# Patient Record
Sex: MALE | Race: WHITE | URBAN - METROPOLITAN AREA
[De-identification: names, ages, dates, MRNs, and addresses within clinical notes are randomized per-mention and may not be internally consistent; named-entity substitution may affect disease eponyms.]

---

## 2017-06-04 ENCOUNTER — TELEPHONE (OUTPATIENT)
Dept: GERIATRICS | Facility: CLINIC | Age: 77
End: 2017-06-04

## 2017-06-04 NOTE — TELEPHONE ENCOUNTER
"Patient pulled out payne   Large amount of bleeding suggesting trauma.  Patient very agitated and nursing states he keeps saying \"go away. Leave me alone\"  No c/o penile pain, no obvious trauma but large amounts of blood   On asa   Nursing reports patient pulling on payne ever since he arrived.  Patient confused and unable to be redirected..  Patient not aware he pulled out payne or what the payne is for.  Order  Keep payne out for now  Bladder scan bid  St cath bid for bladder scan   "

## 2017-06-06 ENCOUNTER — NURSING HOME VISIT (OUTPATIENT)
Dept: GERIATRICS | Facility: CLINIC | Age: 77
End: 2017-06-06
Payer: COMMERCIAL

## 2017-06-06 VITALS
DIASTOLIC BLOOD PRESSURE: 78 MMHG | WEIGHT: 185.8 LBS | RESPIRATION RATE: 20 BRPM | HEART RATE: 71 BPM | SYSTOLIC BLOOD PRESSURE: 177 MMHG | TEMPERATURE: 97.2 F | OXYGEN SATURATION: 94 %

## 2017-06-06 DIAGNOSIS — F01.50 VASCULAR DEMENTIA WITHOUT BEHAVIORAL DISTURBANCE (H): Primary | ICD-10-CM

## 2017-06-06 DIAGNOSIS — N39.0 ACUTE URINARY TRACT INFECTION: ICD-10-CM

## 2017-06-06 DIAGNOSIS — E11.22 TYPE 2 DIABETES MELLITUS WITH STAGE 3 CHRONIC KIDNEY DISEASE, WITH LONG-TERM CURRENT USE OF INSULIN (H): ICD-10-CM

## 2017-06-06 DIAGNOSIS — Z71.89 ADVANCED DIRECTIVES, COUNSELING/DISCUSSION: ICD-10-CM

## 2017-06-06 DIAGNOSIS — N18.30 TYPE 2 DIABETES MELLITUS WITH STAGE 3 CHRONIC KIDNEY DISEASE, WITH LONG-TERM CURRENT USE OF INSULIN (H): ICD-10-CM

## 2017-06-06 DIAGNOSIS — Z79.4 TYPE 2 DIABETES MELLITUS WITH STAGE 3 CHRONIC KIDNEY DISEASE, WITH LONG-TERM CURRENT USE OF INSULIN (H): ICD-10-CM

## 2017-06-06 DIAGNOSIS — N18.30 CHRONIC KIDNEY DISEASE, STAGE III (MODERATE) (H): ICD-10-CM

## 2017-06-06 DIAGNOSIS — I10 ESSENTIAL HYPERTENSION: ICD-10-CM

## 2017-06-06 DIAGNOSIS — E11.42 TYPE 2 DIABETES MELLITUS WITH DIABETIC POLYNEUROPATHY, WITHOUT LONG-TERM CURRENT USE OF INSULIN (H): ICD-10-CM

## 2017-06-06 DIAGNOSIS — R33.9 URINARY RETENTION: ICD-10-CM

## 2017-06-06 PROBLEM — N17.9 ACUTE RENAL FAILURE (H): Status: ACTIVE | Noted: 2017-05-23

## 2017-06-06 PROBLEM — K74.60 HEPATIC CIRRHOSIS (H): Status: ACTIVE | Noted: 2017-05-23

## 2017-06-06 PROBLEM — E11.21 DIABETIC NEPHROPATHY (H): Status: ACTIVE | Noted: 2017-05-23

## 2017-06-06 PROCEDURE — 99310 SBSQ NF CARE HIGH MDM 45: CPT | Performed by: NURSE PRACTITIONER

## 2017-06-06 PROCEDURE — 99207 ZZC CDG-CORRECTLY CODED, REVIEWED AND AGREE: CPT | Performed by: NURSE PRACTITIONER

## 2017-06-06 RX ORDER — TAMSULOSIN HYDROCHLORIDE 0.4 MG/1
0.4 CAPSULE ORAL DAILY
COMMUNITY
Start: 2017-06-02

## 2017-06-06 RX ORDER — OLANZAPINE 5 MG/1
5 TABLET, ORALLY DISINTEGRATING ORAL AT BEDTIME
COMMUNITY
Start: 2017-06-01 | End: 2017-06-30

## 2017-06-06 NOTE — PROGRESS NOTES
[place] [facility]  PRIMARY CARE PROVIDER AND CLINIC:  No primary care provider on file. No primary physician on file.  Chief Complaint   Patient presents with     Clinic Care Coordination - Initial     Hospital F/U       HPI:    Jasson Argueta is a 77 year old  (1940),admitted to the [facility] [place] from [facility].  Hospital stay 5/22/17  through 6/1/17.  Admitted to this facility for  rehab, medical management and nursing care.  Current issues are:         Vascular dementia without behavioral disturbance  Acute urinary tract infection  Type 2 diabetes mellitus with stage 3 chronic kidney disease, with long-term current use of insulin (H)  Type 2 diabetes mellitus with diabetic polyneuropathy, without long-term current use of insulin (H)  Chronic kidney disease, stage III (moderate)  Essential hypertension  Advanced directives, counseling/discussion   Patient admitted to hospital after being found down-he had not gotten the paper in several days and neighbors called 911. 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 He was found to be confused, with UTI, dehydration, arf, DM2, HTN. He was rehydrated, developed some urinary retention and a catheter was placed. Started on flomax. He was then moved to TCU for ongoing therapy, strengthening.     Today, he is alert, confused and forgetful. He is mostly compliant with cares, but forgets that he should not walk alone. He is supposed to walk with staff/ rolling walker. He states the year as 2012, the month as January, his location as [place], he cannot recall the name of his wife nor his daughter, reports he has a son-Martin.     CODE STATUS/ADVANCE DIRECTIVES DISCUSSION:   CPR/Full code   Patient's living condition: lives with spouse    ALLERGIES:Review of patient's allergies indicates no known allergies.  PAST MEDICAL HISTORY:  has no past medical history on file.  PAST SURGICAL HISTORY:  has no past surgical  history on file.  FAMILY HISTORY: family history is not on file.  SOCIAL HISTORY:      Post Discharge Medication Reconciliation Status: discharge medications reconciled and changed, per note/orders (see AVS).  Current Outpatient Prescriptions   Medication Sig Dispense Refill     ASPIRIN PO Take 81 mg by mouth daily       tamsulosin (FLOMAX) 0.4 MG capsule Take 0.4 mg by mouth daily       AmLODIPine Besylate (NORVASC PO) Take 10 mg by mouth daily       OLANZapine zydis (ZYPREXA) 5 MG ODT tab Take 5 mg by mouth At Bedtime       PRAVASTATIN SODIUM PO Take 40 mg by mouth At Bedtime       METOPROLOL SUCCINATE ER PO Take 50 mg by mouth 2 times daily         ROS:  Unreliable secondary to cognitive impairment or aphasia, but today pt reports no concerns.     Exam:  /78  Pulse 71  Temp 97.2  F (36.2  C)  Resp 20  Wt 185 lb 12.8 oz (84.3 kg)  SpO2 94%  GENERAL APPEARANCE:  Alert, in no distress  HEAD:  Normal, normocephalic, atraumatic  EYE EXAM: normal external eye, conjunctiva, lids, ALEJANDRA  NECK EXAM: supple, no JVD  CHEST/RESP:  respiratory effort normal, lung sounds CTA , no respiratory distress  CV:  Rate reg, rhythm reg, no murmur, no peripheral edema   M/S:   extremities normal, gait abnormal-falls risk, normal muscle tone, and range of motion normal   NEUROLOGIC EXAM: Normal gross motor movement, tone and coordination. No tremor.  Cranial nerves 2-12 are normal tested and grossly at patient's baseline  PSYCH:  Alert and oriented to self, affect pleasant , judgement impaired by cognitive losses     Lab/Diagnostic data:     POTASSIUM (05/30/2017 5:28 PM)  Only the most recent of 4 results within the time period is included.    POTASSIUM (05/30/2017 5:28 PM)   Component Value Ref Range   POTASSIUM 4.3 3.5 - 5.0 mmol/L     POTASSIUM (05/30/2017 5:28 PM)   Specimen Performing Laboratory   Blood [facility] LABORATORY    INTERNAL ZIP [id number]    [address] [place] [address]     Back to top  of Results      MAGNESIUM (05/30/2017 5:28 PM)  Only the most recent of 3 results within the time period is included.    MAGNESIUM (05/30/2017 5:28 PM)   Component Value Ref Range   MAGNESIUM 1.5 (L) 1.6 - 2.6 mg/dL     MAGNESIUM (05/30/2017 5:28 PM)   Specimen Performing Laboratory   Blood [facility] LABORATORY    INTERNAL ZIP [id number]    [address] [place] [address]     Back to top of Results      CREATININE (05/30/2017 6:14 AM)  Only the most recent of 3 results within the time period is included.    CREATININE (05/30/2017 6:14 AM)   Component Value Ref Range   CREATININE 1.19 0.72 - 1.25 mg/dL   GFR if African American >60 >60 ml/min/1.73m2   GFR if not African American 59 (L) >60 ml/min/1.73m2     CREATININE (05/30/2017 6:14 AM)   Specimen Performing Laboratory   Blood [facility] LABORATORY    INTERNAL ZIP [id number]    [address] [place] [address]     Back to top of Results      URINALYSIS MICROSCOPIC (05/29/2017 6:35 PM)  Only the most recent of 2 results within the time period is included.    URINALYSIS MICROSCOPIC (05/29/2017 6:35 PM)   Component Value Ref Range   RBC 3-5 (A) 0-2, None Seen /HPF   WBC 6-10 (A) 0-2, 3-5, None Seen /HPF   BACTERIA  Few None Seen, Few Bacteria/HPF   EPITHELIAL CELLS  None Seen None Seen, Few Epi/HPF     URINALYSIS MICROSCOPIC (05/29/2017 6:35 PM)   Specimen Performing Laboratory   Urine [facility] LABORATORY    INTERNAL ZIP [id number]    [address] [place] [address]     Back to top of Results      URINE CULTURE (05/29/2017 6:35 PM)  Only the most recent of 2 results within the time period is included.    URINE CULTURE (05/29/2017 6:35 PM)   Component Value Ref Range   CULTURE No growth (<1,000 CFU/mL)       URINE CULTURE (05/29/2017 6:35 PM)   Specimen Performing Laboratory   Urine [facility] LABORATORY-CENTRAL LABORATORY    [address]    [place] [address]     Back to top of  Results      URINALYSIS W REFLEX MICROSCOPIC IF POSITIVE (05/29/2017 6:35 PM)  Only the most recent of 2 results within the time period is included.    URINALYSIS W REFLEX MICROSCOPIC IF POSITIVE (05/29/2017 6:35 PM)   Component Value Ref Range   COLOR  Yellow Yellow Color   CLARITY  Clear Clear Clarity   SPECIFIC GRAVITY,URINE  1.010 1.010, 1.015, 1.020, 1.025    PH,URINE  6.5 6.0, 7.0, 8.0, 5.5, 6.5, 7.5, 8.5    UROBILINOGEN,QUALITATIVE  Normal Normal EU/dl   PROTEIN, URINE 100 (A) Negative mg/dL   GLUCOSE, URINE 100 (A) Negative mg/dL   KETONES,URINE  Negative Negative mg/dL   BILIRUBIN,URINE  Negative Negative    OCCULT BLOOD,URINE  Trace (A) Negative    NITRITE  Negative Negative    LEUKOCYTE ESTERASE  Negative Negative      URINALYSIS W REFLEX MICROSCOPIC IF POSITIVE (05/29/2017 6:35 PM)   Specimen Performing Laboratory   Urine [facility] LABORATORY    INTERNAL ZIP [id number] [address] [place] [address]     Back to top of Results      BASIC METABOLIC PANEL (05/28/2017 6:16 AM)  Only the most recent of 3 results within the time period is included.    BASIC METABOLIC PANEL (05/28/2017 6:16 AM)   Component Value Ref Range   SODIUM 142 135 - 145 mmol/L   POTASSIUM 3.3 (L) 3.5 - 5.0 mmol/L   CHLORIDE 112 (H) 98 - 110 mmol/L   CO2,TOTAL 20 (L) 21 - 31 mmol/L   ANION GAP 10 5 - 18    GLUCOSE 126 (H) 65 - 100 mg/dL   CALCIUM 8.3 (L) 8.5 - 10.5 mg/dL   BUN 24 8 - 25 mg/dL   CREATININE 1.45 (H) 0.72 - 1.25 mg/dL   BUN/CREAT RATIO  17 10 - 20    GFR if  57 (L) >60 ml/min/1.73m2   GFR if not  47 (L) >60 ml/min/1.73m2     BASIC METABOLIC PANEL (05/28/2017 6:16 AM)   Specimen Performing Laboratory   Blood [facility] LABORATORY    INTERNAL ZIP [id number] [address] [place] [address]     Back to top of Results      CBC WITH AUTO DIFFERENTIAL (05/27/2017 6:46 AM)  Only the most recent of 2 results within the time period is included.    CBC  WITH AUTO DIFFERENTIAL (05/27/2017 6:46 AM)   Component Value Ref Range   WHITE BLOOD COUNT  6.3 4.5 - 11.0 thou/cu mm   RED BLOOD COUNT  4.39 4.30 - 5.90 mil/cu mm   HEMOGLOBIN  12.4 (L) 13.5 - 17.5 g/dL   HEMATOCRIT  37.1 37.0 - 53.0 %   MCV  85 80 - 100 fL   MCH  28.2 26.0 - 34.0 pg   MCHC  33.4 32.0 - 36.0 g/dL   RDW  16.6 (H) 11.5 - 15.5 %   PLATELET COUNT  196 140 - 440 thou/cu mm   MPV  10.8 6.5 - 11.0 fL   NEUTROPHILS  58.8 42.0 - 72.0 %   LYMPHOCYTES  26.4 20.0 - 44.0 %   MONOCYTES  9.9 <12.0 %   EOSINOPHILS  4.3 <8.0 %   BASOPHILS  0.3 <3.0 %   IMMATURE GRANULOCYTES(METAS,MYELOS,PROS) 0.3 %   ABSOLUTE NEUTROPHILS  3.7 1.7 - 7.0 thou/cu mm   ABSOLUTE LYMPHOCYTES  1.7 0.9 - 2.9 thou/cu mm   ABSOLUTE MONOCYTES  0.6 <0.9 thou/cu mm   ABSOLUTE EOSINOPHILS  0.3 <0.5 thou/cu mm   ABSOLUTE BASOPHILS  0.0 <0.3 thou/cu mm   ABSOLUTE IMMATURE GRANULOCYTES(METAS,MYELOS,PROS) 0.0 <0.3 thou/cu mm     TSH (05/25/2017 7:14 AM)  TSH (05/25/2017 7:14 AM)   Component Value Ref Range   TSH 0.09 (L) 0.35 - 4.94 uIU/mL       T3,TOTAL (05/25/2017 7:14 AM)  T3,TOTAL (05/25/2017 7:14 AM)   Component Value Ref Range   T3,TOTAL 80 58 - 159 ng/dL     T4,FREE (05/25/2017 7:14 AM)  T4,FREE (05/25/2017 7:14 AM)   Component Value Ref Range   T4,FREE 1.24 0.70 - 1.80 ng/dL     FOLIC ACID (05/25/2017 7:14 AM)  FOLIC ACID (05/25/2017 7:14 AM)   Component Value Ref Range   FOLIC ACID 17.6 >=4.0 ng/mL       VITAMIN B12 (05/25/2017 7:14 AM)  VITAMIN B12 (05/25/2017 7:14 AM)   Component Value Ref Range   VITAMIN B12 889 180 - 914 pg/mL       AMMONIA (05/24/2017 6:56 AM)  AMMONIA (05/24/2017 6:56 AM)   Component Value Ref Range   AMMONIA 23 11 - 35 umol/L       RENAL FUNCTION PANEL (05/23/2017 8:48 AM)  Only the most recent of 3 results within the time period is included.    RENAL FUNCTION PANEL (05/23/2017 8:48 AM)   Component Value Ref Range   SODIUM 143 135 - 145 mmol/L   POTASSIUM 3.7 3.5 - 5.0 mmol/L   CHLORIDE 111 (H) 98 - 110 mmol/L    CO2,TOTAL 20 (L) 21 - 31 mmol/L   ANION GAP 12 5 - 18    GLUCOSE 128 (H) 65 - 100 mg/dL   CALCIUM 8.2 (L) 8.5 - 10.5 mg/dL   BUN 51 (H) 8 - 25 mg/dL   CREATININE 1.92 (H) 0.72 - 1.25 mg/dL   BUN/CREAT RATIO  27 (H) 10 - 20    GFR if  41 (L) >60 ml/min/1.73m2   GFR if not  34 (L) >60 ml/min/1.73m2   PHOSPHORUS 2.7 2.3 - 4.7 mg/dL   ALBUMIN 2.8 (L) 3.2 - 4.6 g/dL       Hemoglobin A1C (05/22/2017 11:56 PM)  Hemoglobin A1C (05/22/2017 11:56 PM)   Component Value Ref Range   HEMOGLOBIN A1C MONITORING (POCT) 5.1 <=6.4 %           ASSESSMENT/PLAN:  Vascular dementia without behavioral disturbance  Fairly sudden onset, per daughter report, over the last few months. She reports he has been doing a good job of taking care of his spouse, managing the home.     Acute urinary tract infection  New, treatment with antibiotics while hospitalized, no antibiotics at discharge from hospital     Type 2 diabetes mellitus with stage 3 chronic kidney disease, with long-term current use of insulin (H)  Type 2 diabetes mellitus with diabetic polyneuropathy, without long-term current use of insulin (H)  Chronic long standing high dose insulin use, (70/30 70+ units daily ) and this was discontinued while hospitalized. A1C low, at 5 %. No current blood sugar being tracked    Chronic kidney disease, stage III (moderate)  Reported creat as above. Avoid nephrotoxic medications, Renal dosing of medications, monitor kidney function this week.      Essential hypertension  On amlodipine, metoprolol for rate control. The current medical regimen is effective;  continue present plan and medications.     Advanced directives, counseling/discussion  Signed POLST sent to [facility] for scanning  - Full Code    Urinary retention  Reported urinary retention and started on flomax. Upon his arrival to TCU, he traumatically removed the payne with balloon intact. He has been voiding, PVR <400 and no need to strait cath yet. Will  monitor and consider urology referral if he so desires.        Orders:  1.  Blood sugar checks BID.  Dx:  DM2  2.  DC Intake and output  3.  Lab draws 6/8/17 to include:  BMP dx:  CKD, CBC dx:  Anemia, A1C dx:  DM2  4.  Continue post void bladder scans and BID strait cath if scan >400cc.  Dx:  Urinary retention      Information reviewed:  Medications, vital signs, orders, nursing notes, problem list, hospital information. Total time spent with patient visit was 35 min including patient visit, review of past records and phone call to patient contact. Greater than 50% of total time spent with counseling and coordinating care.    Electronically signed by:  Eugenie Barkley CNP   [facility]  [phone] cell

## 2017-06-08 ENCOUNTER — HOSPITAL LABORATORY (OUTPATIENT)
Facility: OTHER | Age: 77
End: 2017-06-08

## 2017-06-08 LAB
ANION GAP SERPL CALCULATED.3IONS-SCNC: 9 MMOL/L (ref 3–14)
BUN SERPL-MCNC: 20 MG/DL (ref 7–30)
CALCIUM SERPL-MCNC: 8.4 MG/DL (ref 8.5–10.1)
CHLORIDE SERPL-SCNC: 112 MMOL/L (ref 94–109)
CO2 SERPL-SCNC: 23 MMOL/L (ref 20–32)
CREAT SERPL-MCNC: 1.18 MG/DL (ref 0.66–1.25)
ERYTHROCYTE [DISTWIDTH] IN BLOOD BY AUTOMATED COUNT: 17.1 % (ref 10–15)
GFR SERPL CREATININE-BSD FRML MDRD: 60 ML/MIN/1.7M2
GLUCOSE SERPL-MCNC: 155 MG/DL (ref 70–99)
HBA1C MFR BLD: 5.9 % (ref 4.3–6)
HCT VFR BLD AUTO: 32 % (ref 40–53)
HGB BLD-MCNC: 10.3 G/DL (ref 13.3–17.7)
MCH RBC QN AUTO: 27.8 PG (ref 26.5–33)
MCHC RBC AUTO-ENTMCNC: 32.2 G/DL (ref 31.5–36.5)
MCV RBC AUTO: 86 FL (ref 78–100)
PLATELET # BLD AUTO: 195 10E9/L (ref 150–450)
POTASSIUM SERPL-SCNC: 3.8 MMOL/L (ref 3.4–5.3)
RBC # BLD AUTO: 3.71 10E12/L (ref 4.4–5.9)
SODIUM SERPL-SCNC: 144 MMOL/L (ref 133–144)
WBC # BLD AUTO: 6.7 10E9/L (ref 4–11)

## 2017-06-13 VITALS
HEART RATE: 60 BPM | DIASTOLIC BLOOD PRESSURE: 60 MMHG | SYSTOLIC BLOOD PRESSURE: 138 MMHG | OXYGEN SATURATION: 95 % | RESPIRATION RATE: 18 BRPM | WEIGHT: 185.8 LBS | TEMPERATURE: 97.5 F

## 2017-06-13 NOTE — PROGRESS NOTES
[place] [facility]  PRIMARY CARE PROVIDER AND CLINIC:  Nickolas Barahona [facility] [address] / [facility] [address]  Chief Complaint   Patient presents with     Clinic Care Coordination - Initial       HPI:    Jasson Argueta is a 77 year old  (1940),admitted to the [facility] [place] from [facility].  Hospital stay 5/22/17  through 6/1/17.  Admitted to this facility for  rehab, medical management and nursing care.  Current issues are:      Non-traumatic rhabdomyolysis  - admitted to hospital for rhabdomyolysis, complicated with renal failure, and also had UTI. All these resolved. Admitted to this facility for rehab.     Physical deconditioning  -started on OT/PT and repots making some kelly.     Type 2 diabetes mellitus with stage 3 chronic kidney disease, with long-term current use of insulin (H)  - had  Hypoglycemia during hospitalization, insulin stopped. Oral intake is good.     Essential hypertension  - No other significant past medical history or family history.denies HA    Common wart  over left hand and wants it removed.     Vascular dementia without behavioral disturbance  - no issue reported.     CODE STATUS/ADVANCE DIRECTIVES DISCUSSION:   CPR/Full code   Patient's living condition: lives with spouse    ALLERGIES:Review of patient's allergies indicates no known allergies.  PAST MEDICAL HISTORY:  T2DM, CKD, dementia, HTN  PAST SURGICAL HISTORY:  has no past surgical history on file.  FAMILY HISTORY: non contributory.   SOCIAL HISTORY:  reports that he has been smoking Cigarettes.  He has a 25.00 pack-year smoking history. He does not have any smokeless tobacco history on file. He reports that he does not use illicit drugs.    Post Discharge Medication Reconciliation Status: discharge medications reconciled and changed, per note/orders (see AVS).  Current Outpatient Prescriptions   Medication Sig Dispense Refill     ASPIRIN PO Take 81 mg by mouth daily        tamsulosin (FLOMAX) 0.4 MG capsule Take 0.4 mg by mouth daily       AmLODIPine Besylate (NORVASC PO) Take 10 mg by mouth daily       OLANZapine zydis (ZYPREXA) 5 MG ODT tab Take 5 mg by mouth At Bedtime       PRAVASTATIN SODIUM PO Take 40 mg by mouth At Bedtime       METOPROLOL SUCCINATE ER PO Take 50 mg by mouth 2 times daily       insulin glargine (LANTUS) 100 UNIT/ML injection Inject 10 Units Subcutaneous every morning         ROS:  10 point ROS of systems including Constitutional, Eyes, Respiratory, Cardiovascular, Gastroenterology, Genitourinary, Integumentary, Muscularskeletal, Psychiatric were all negative except for pertinent positives noted in my HPI.    Exam:  /60  Pulse 60  Temp 97.5  F (36.4  C)  Resp 18  Wt 185 lb 12.8 oz (84.3 kg)  SpO2 95%  GENERAL APPEARANCE:  Alert, in no distress  ENT:  Mouth and posterior oropharynx normal, moist mucous membranes  EYES:  EOM, conjunctivae, lids, pupils and irises normal  NECK:  No adenopathy,masses or thyromegaly  RESP:  respiratory effort and palpation of chest normal, lungs clear to auscultation , no respiratory distress  CV:  Palpation and auscultation of heart done , regular rate and rhythm, no murmur, rub, or gallop, peripheral edema 1+ pedal b/l. + in .  ABDOMEN:  normal bowel sounds, soft, nontender, no hepatosplenomegaly or other masses  M/S:   Gait and station abnormal . Limbs on the right side. Proximal muscles weakness. Kyphosis.   SKIN:  Inspection of skin and subcutaneous tissue baseline, Palpation of skin and subcutaneous tissue baseline, warts over dorsal surface of left hand  NEURO:   Cranial nerves 2-12 are normal tested and grossly at patient's baseline, no purposeful movement in upper and lower extremities  PSYCH:  oriented X 3, normal insight, judgement and memory    Lab/Diagnostic data:   CBC RESULTS:   Recent Labs   Lab Test  06/08/17   0600   WBC  6.7   RBC  3.71*   HGB  10.3*   HCT  32.0*   MCV  86   MCH  27.8   MCHC  32.2   RDW   17.1*   PLT  195       Last Basic Metabolic Panel:  Recent Labs   Lab Test  06/08/17   0600   NA  144   POTASSIUM  3.8   CHLORIDE  112*   DEMETRIO  8.4*   CO2  23   BUN  20   CR  1.18   GLC  155*     Lab Results   Component Value Date    A1C 5.9 06/08/2017       ASSESSMENT/PLAN:  Non-traumatic rhabdomyolysis  - resolved.     Physical deconditioning  - improving on OT/PT, continue.     Type 2 diabetes mellitus with stage 3 chronic kidney disease, with long-term current use of insulin (H)   Lab Results   Component Value Date    A1C 5.9 06/08/2017   - insulin stopped during recent hospitalization. Continue to monitor and manage accordingly.   -  Keep HbA1C b/w 8-9% (per AGS there is a potential harm in lowering A1C to <6.5 % in older adults with diabetes), life expectancy less than 5 years, tight glucose control is note recommended      Essential hypertension   BP Readings from Last 3 Encounters:   06/22/17 130/70   06/19/17 145/66   06/16/17 133/70   - Keep SBP> 130 mmHg and DBP > 65 mmHg (levels below these increase mortality as shown by standard studies and observations).     Common wart  - offered cryotherapy instead of surgical resection, agreed. GNP will proceed with this therapy.     Vascular dementia without behavioral disturbance  - ACL 4.1 which c/w moderate cognition, and requires 24 hours supervision.        Orders:  1.  Biofreeze for wart.      Information reviewed:  Medications, vital signs, orders, nursing notes, problem list, hospital information. Total time spent with patient visit was 35 min including patient visit and review of past records.     Electronically signed by:  Mary Rosario MD

## 2017-06-14 ENCOUNTER — NURSING HOME VISIT (OUTPATIENT)
Dept: GERIATRICS | Facility: CLINIC | Age: 77
End: 2017-06-14
Payer: COMMERCIAL

## 2017-06-14 DIAGNOSIS — R53.81 PHYSICAL DECONDITIONING: ICD-10-CM

## 2017-06-14 DIAGNOSIS — M62.82 NON-TRAUMATIC RHABDOMYOLYSIS: Primary | ICD-10-CM

## 2017-06-14 DIAGNOSIS — B07.8 COMMON WART: ICD-10-CM

## 2017-06-14 DIAGNOSIS — F01.50 VASCULAR DEMENTIA WITHOUT BEHAVIORAL DISTURBANCE (H): ICD-10-CM

## 2017-06-14 DIAGNOSIS — N18.30 TYPE 2 DIABETES MELLITUS WITH STAGE 3 CHRONIC KIDNEY DISEASE, WITH LONG-TERM CURRENT USE OF INSULIN (H): ICD-10-CM

## 2017-06-14 DIAGNOSIS — Z79.4 TYPE 2 DIABETES MELLITUS WITH STAGE 3 CHRONIC KIDNEY DISEASE, WITH LONG-TERM CURRENT USE OF INSULIN (H): ICD-10-CM

## 2017-06-14 DIAGNOSIS — E11.22 TYPE 2 DIABETES MELLITUS WITH STAGE 3 CHRONIC KIDNEY DISEASE, WITH LONG-TERM CURRENT USE OF INSULIN (H): ICD-10-CM

## 2017-06-14 DIAGNOSIS — I10 ESSENTIAL HYPERTENSION: ICD-10-CM

## 2017-06-14 PROCEDURE — 99207 ZZC CDG-CORRECTLY CODED, REVIEWED AND AGREE: CPT | Performed by: FAMILY MEDICINE

## 2017-06-14 PROCEDURE — 99305 1ST NF CARE MODERATE MDM 35: CPT | Performed by: FAMILY MEDICINE

## 2017-06-16 ENCOUNTER — NURSING HOME VISIT (OUTPATIENT)
Dept: GERIATRICS | Facility: CLINIC | Age: 77
End: 2017-06-16
Payer: COMMERCIAL

## 2017-06-16 VITALS
RESPIRATION RATE: 18 BRPM | TEMPERATURE: 97.4 F | HEART RATE: 60 BPM | DIASTOLIC BLOOD PRESSURE: 70 MMHG | SYSTOLIC BLOOD PRESSURE: 133 MMHG | WEIGHT: 185 LBS | OXYGEN SATURATION: 94 %

## 2017-06-16 DIAGNOSIS — B07.8 COMMON WART: Primary | ICD-10-CM

## 2017-06-16 PROCEDURE — 17110 DESTRUCTION B9 LES UP TO 14: CPT | Performed by: NURSE PRACTITIONER

## 2017-06-16 NOTE — PROGRESS NOTES
[place] [facility]    Chief Complaint   Patient presents with     RECHECK       HPI:    Jasson Argueta is a 77 year old  (1940), who is being seen today for an episodic care visit at [facility] [place].  HPI information obtained from: facility staff and patient report.Today's concern is:  Common wart  Patient has large wart on left hand between thumb and first finger.  Patient c/o that this wart is very bothersome and he would like it removed      ALLERGIES: Review of patient's allergies indicates no known allergies.  Past Medical, Surgical, Family and Social History reviewed and updated in [facility].    Current Outpatient Prescriptions   Medication Sig Dispense Refill     ASPIRIN PO Take 81 mg by mouth daily       tamsulosin (FLOMAX) 0.4 MG capsule Take 0.4 mg by mouth daily       AmLODIPine Besylate (NORVASC PO) Take 10 mg by mouth daily       OLANZapine zydis (ZYPREXA) 5 MG ODT tab Take 5 mg by mouth At Bedtime       PRAVASTATIN SODIUM PO Take 40 mg by mouth At Bedtime       METOPROLOL SUCCINATE ER PO Take 50 mg by mouth 2 times daily       Medications reviewed:  Medications reconciled to facility chart and changes were made to reflect current medications as identified as above med list. Below are the changes that were made:   Medications stopped since last EPIC medication reconciliation:   There are no discontinued medications.    Medications started since last [facility] medication reconciliation:  No orders of the defined types were placed in this encounter.    REVIEW OF SYSTEMS:  4 point ROS including Respiratory, CV, GI and , other than that noted in the HPI,  is negative    Physical Exam:  /70  Pulse 60  Temp 97.4  F (36.3  C)  Resp 18  Wt 185 lb (83.9 kg)  SpO2 94%  GENERAL APPEARANCE:  Alert, in no distress  RESP:  respiratory effort and palpation of chest normal, auscultation of lungs CTA , no respiratory distress  CV:  Palpation and auscultation of heart done , rate and rhythm  regular  SKIN:  Large wart on left hand   PSYCH:  Alert, oriented to self        Assessment/Plan:  Common wart  Cryotherapy to wart      Orders:  Repeat weekly as necessary      Electronically signed by  MELVIN Walters CNP

## 2017-06-19 ENCOUNTER — NURSING HOME VISIT (OUTPATIENT)
Dept: GERIATRICS | Facility: CLINIC | Age: 77
End: 2017-06-19
Payer: COMMERCIAL

## 2017-06-19 VITALS
DIASTOLIC BLOOD PRESSURE: 66 MMHG | SYSTOLIC BLOOD PRESSURE: 145 MMHG | HEART RATE: 54 BPM | RESPIRATION RATE: 18 BRPM | TEMPERATURE: 96 F | OXYGEN SATURATION: 97 % | WEIGHT: 185 LBS

## 2017-06-19 DIAGNOSIS — E11.22 TYPE 2 DIABETES MELLITUS WITH STAGE 3 CHRONIC KIDNEY DISEASE, WITH LONG-TERM CURRENT USE OF INSULIN (H): Primary | ICD-10-CM

## 2017-06-19 DIAGNOSIS — Z79.4 TYPE 2 DIABETES MELLITUS WITH STAGE 3 CHRONIC KIDNEY DISEASE, WITH LONG-TERM CURRENT USE OF INSULIN (H): Primary | ICD-10-CM

## 2017-06-19 DIAGNOSIS — B07.8 COMMON WART: ICD-10-CM

## 2017-06-19 DIAGNOSIS — N18.30 TYPE 2 DIABETES MELLITUS WITH STAGE 3 CHRONIC KIDNEY DISEASE, WITH LONG-TERM CURRENT USE OF INSULIN (H): Primary | ICD-10-CM

## 2017-06-19 DIAGNOSIS — R33.9 URINARY RETENTION: ICD-10-CM

## 2017-06-19 PROCEDURE — 99207 ZZC CDG-CORRECTLY CODED, REVIEWED AND AGREE: CPT | Performed by: NURSE PRACTITIONER

## 2017-06-19 PROCEDURE — 99309 SBSQ NF CARE MODERATE MDM 30: CPT | Performed by: NURSE PRACTITIONER

## 2017-06-19 NOTE — PATIENT INSTRUCTIONS
Orders:  1.  Start Insulin Glargine 10 units sq daily.  2.  Continue bid blood glucose checks for DM.  3.  D/C bladder scanning.

## 2017-06-19 NOTE — PROGRESS NOTES
[facility]    Chief Complaint   Patient presents with     RECHECK       HPI:    Jasson Argueta is a 77 year old  (1940), who is being seen today for an episodic [facility].  HPI information obtained from: facility chart records, facility staff and patient report.Today's concern is:  Type 2 diabetes mellitus with stage 3 chronic kidney disease, with long-term current use of insulin (H)  Patient had previously been on insulin.  Was d/c'd in hospital due to hypoglycemia.  Blood sugars have been running high    06/19/2017 06:50 246 mg/dL  06/18/2017 19:20 498 mg/dL  06/18/2017 05:07 189 mg/dL  06/17/2017 20:48 167 mg/dL  06/17/2017 06:56 152 mg/dL  06/16/2017 19:01 186 mg/dL  06/16/2017 05:58 175 mg/dL  06/15/2017 19:18 239 mg/dL  06/15/2017 05:05 204 mg/dL  06/14/2017 20:04 366 mg/dL  06/14/2017 07:28 147 mg/dL  06/13/2017 20:10 390 mg/dL  06/13/2017 08:49 154 mg/dL  06/12/2017 19:21 327 mg/dL  06/12/2017 07:42 164 mg/dL    Urinary retention  Patient states that he is not having problems emptying his bladder.  Is refusing the bladder scanning    Common wart  Was frozen on 6/16.  Has acquired a blister that is not bothersome to him but the staff has been concerned      ALLERGIES: Review of patient's allergies indicates no known allergies.  Past Medical, Surgical, Family and Social History reviewed and updated in [facility].    Current Outpatient Prescriptions   Medication Sig Dispense Refill     insulin glargine (LANTUS) 100 UNIT/ML injection Inject 10 Units Subcutaneous every morning       ASPIRIN PO Take 81 mg by mouth daily       tamsulosin (FLOMAX) 0.4 MG capsule Take 0.4 mg by mouth daily       AmLODIPine Besylate (NORVASC PO) Take 10 mg by mouth daily       OLANZapine zydis (ZYPREXA) 5 MG ODT tab Take 5 mg by mouth At Bedtime       PRAVASTATIN SODIUM PO Take 40 mg by mouth At Bedtime       METOPROLOL SUCCINATE ER PO Take 50 mg by mouth 2 times daily       Medications  reviewed:  Medications reconciled to facility chart and changes were made to reflect current medications as identified as above med list. Below are the changes that were made:   Medications stopped since last EPIC medication reconciliation:   There are no discontinued medications.    Medications started since last [facility] medication reconciliation:  No orders of the defined types were placed in this encounter.    REVIEW OF SYSTEMS:  4 point ROS including Respiratory, CV, GI and , other than that noted in the HPI,  is negative    Physical Exam:  /66  Pulse 54  Temp 96  F (35.6  C)  Resp 18  Wt 185 lb (83.9 kg)  SpO2 97%  GENERAL APPEARANCE:  Alert, in no distress, pleasant, talkative  M/S:   Gait and station abnormal-in W/C,receiving PT  SKIN:  ~3cm serous filled blister on left hand adjacent to wart.  PSYCH:  insight and judgement, memory impaired , affect and mood normal      Recent Labs:  CBC RESULTS:   Recent Labs   Lab Test  06/08/17   0600   WBC  6.7   RBC  3.71*   HGB  10.3*   HCT  32.0*   MCV  86   MCH  27.8   MCHC  32.2   RDW  17.1*   PLT  195       Last Basic Metabolic Panel:  Recent Labs   Lab Test  06/08/17   0600   NA  144   POTASSIUM  3.8   CHLORIDE  112*   DEMETRIO  8.4*   CO2  23   BUN  20   CR  1.18   GLC  155*         Lab Results   Component Value Date    A1C 5.9 06/08/2017           Assessment/Plan:  Type 2 diabetes mellitus with stage 3 chronic kidney disease, with long-term current use of insulin (H)  Elevated blood glucose levels.  Patient getting regular meals and BGM. Will re-start some baseline insulin    Urinary retention  resolving    Common wart  Surrounding skin irritated from freezing.  Will monitor      Orders:  1.  Start Insulin Glargine 10 units sq daily.  2.  Continue bid blood glucose checks for DM.  3.  D/C bladder scanning.      Total time spent with patient visit at the skilled nursing facility was 25 min including patient visit and review of past records. Greater than 50%  of total time spent with counseling and coordinating care due to BG/insulin    Electronically signed by  MELVIN Walters CNP

## 2017-06-22 VITALS
TEMPERATURE: 98.5 F | HEART RATE: 73 BPM | RESPIRATION RATE: 17 BRPM | OXYGEN SATURATION: 97 % | DIASTOLIC BLOOD PRESSURE: 70 MMHG | SYSTOLIC BLOOD PRESSURE: 130 MMHG

## 2017-06-22 NOTE — PROGRESS NOTES
[facility]    Chief Complaint   Patient presents with     Nursing Home Acute       HPI:    Jasson Argueta is a 77 year old  (1940), who is being seen today for an episodic care visit at [facility] [place].  HPI information obtained from: facility chart records, facility staff and patient report.Today's concern is:  Common wart  Patient requests cryotherapy until wart gone.  No c/o pain, just irritation    Type 2 diabetes mellitus with stage 3 chronic kidney disease, with long-term current use of insulin (H)  Blood sugars  06/23/2017 05:25 203 mg/dL  06/22/2017 06:31 142 mg/dL  06/21/2017 17:24 240 mg/dL  06/21/2017 05:49 147 mg/dL  06/20/2017 16:48 178 mg/dL  06/20/2017 06:24 150 mg/dL  06/19/2017 16:35 158 mg/dL  06/19/2017 06:50 246 mg/dL    Vascular dementia without behavioral disturbance  Has been on Oanzapine, manageable since admission, no behaviors noted.  Conf with pharmacist re:  D/c.       ALLERGIES: Review of patient's allergies indicates no known allergies.  Past Medical, Surgical, Family and Social History reviewed and updated in [facility].    Current Outpatient Prescriptions   Medication Sig Dispense Refill     insulin glargine (LANTUS) 100 UNIT/ML injection Inject 10 Units Subcutaneous every morning       ASPIRIN PO Take 81 mg by mouth daily       tamsulosin (FLOMAX) 0.4 MG capsule Take 0.4 mg by mouth daily       AmLODIPine Besylate (NORVASC PO) Take 10 mg by mouth daily       OLANZapine zydis (ZYPREXA) 5 MG ODT tab Take 5 mg by mouth At Bedtime       PRAVASTATIN SODIUM PO Take 40 mg by mouth At Bedtime       METOPROLOL SUCCINATE ER PO Take 50 mg by mouth 2 times daily       Medications reviewed:  Medications reconciled to facility chart and changes were made to reflect current medications as identified as above med list. Below are the changes that were made:   Medications stopped since last EPIC medication reconciliation:   There are no discontinued  medications.    Medications started since last [facility] medication reconciliation:  No orders of the defined types were placed in this encounter.        REVIEW OF SYSTEMS:  4 point ROS including Respiratory, CV, GI and , other than that noted in the HPI,  is negative    Physical Exam:  /70  Pulse 73  Temp 98.5  F (36.9  C)  Resp 17  SpO2 97%   GENERAL APPEARANCE:  Alert, in no distress, appropriately communicative, pleasant affect  RESP:  respiratory effort and palpation of chest normal, auscultation of l2ungs CTa , no respiratory distress  CV:  Palpation and auscultation of heart done , rate and rhythm regular, no peripheral edema  ABDOMEN:  normal bowel sounds, soft, nontender, bladder not distended  SKIN:  Wart on left hand between thumb and first finger just fits within the 12mm cone with resolving blister around it.  Cryotherapy performed.   Stage 2 PU left upper back, stage 2 PU resolving to blanchable red skin just above and to right of coccyx.  Re-dressed by LPN  PSYCH:  insight and judgement, memory impaired , affect and mood normal      Recent Labs:  All labs reviewed, none recent.    Assessment/Plan:  Common wart  Continues.  Will continue weekly cryotherapy    Type 2 diabetes mellitus with stage 3 chronic kidney disease, with long-term current use of insulin (H)  Blood sugars more consistently within range.  Will not change sly    Vascular dementia without behavioral disturbance  No behaviors.  Will d/c antipsycotic      Orders:  D/C Olanzapine  Observe/report any increased behaviors      Electronically signed by  MELVIN Walters CNP

## 2017-06-23 ENCOUNTER — NURSING HOME VISIT (OUTPATIENT)
Dept: GERIATRICS | Facility: CLINIC | Age: 77
End: 2017-06-23
Payer: COMMERCIAL

## 2017-06-23 DIAGNOSIS — E11.22 TYPE 2 DIABETES MELLITUS WITH STAGE 3 CHRONIC KIDNEY DISEASE, WITH LONG-TERM CURRENT USE OF INSULIN (H): ICD-10-CM

## 2017-06-23 DIAGNOSIS — Z79.4 TYPE 2 DIABETES MELLITUS WITH STAGE 3 CHRONIC KIDNEY DISEASE, WITH LONG-TERM CURRENT USE OF INSULIN (H): ICD-10-CM

## 2017-06-23 DIAGNOSIS — F01.50 VASCULAR DEMENTIA WITHOUT BEHAVIORAL DISTURBANCE (H): ICD-10-CM

## 2017-06-23 DIAGNOSIS — B07.8 COMMON WART: Primary | ICD-10-CM

## 2017-06-23 DIAGNOSIS — N18.30 TYPE 2 DIABETES MELLITUS WITH STAGE 3 CHRONIC KIDNEY DISEASE, WITH LONG-TERM CURRENT USE OF INSULIN (H): ICD-10-CM

## 2017-06-23 PROBLEM — E11.8 TYPE 2 DIABETES MELLITUS WITH COMPLICATION, WITH LONG-TERM CURRENT USE OF INSULIN (H): Status: ACTIVE | Noted: 2017-06-23

## 2017-06-23 PROCEDURE — 17110 DESTRUCTION B9 LES UP TO 14: CPT | Performed by: NURSE PRACTITIONER

## 2017-06-23 PROCEDURE — 99308 SBSQ NF CARE LOW MDM 20: CPT | Mod: 25 | Performed by: NURSE PRACTITIONER

## 2017-06-29 ENCOUNTER — HOSPITAL LABORATORY (OUTPATIENT)
Facility: OTHER | Age: 77
End: 2017-06-29

## 2017-06-29 LAB
ANION GAP SERPL CALCULATED.3IONS-SCNC: 8 MMOL/L (ref 3–14)
BUN SERPL-MCNC: 18 MG/DL (ref 7–30)
CALCIUM SERPL-MCNC: 8.6 MG/DL (ref 8.5–10.1)
CHLORIDE SERPL-SCNC: 108 MMOL/L (ref 94–109)
CO2 SERPL-SCNC: 22 MMOL/L (ref 20–32)
CREAT SERPL-MCNC: 1.07 MG/DL (ref 0.66–1.25)
ERYTHROCYTE [DISTWIDTH] IN BLOOD BY AUTOMATED COUNT: 15 % (ref 10–15)
GFR SERPL CREATININE-BSD FRML MDRD: 67 ML/MIN/1.7M2
GLUCOSE SERPL-MCNC: 119 MG/DL (ref 70–99)
HCT VFR BLD AUTO: 29.5 % (ref 40–53)
HGB BLD-MCNC: 9.8 G/DL (ref 13.3–17.7)
MCH RBC QN AUTO: 27.8 PG (ref 26.5–33)
MCHC RBC AUTO-ENTMCNC: 33.2 G/DL (ref 31.5–36.5)
MCV RBC AUTO: 84 FL (ref 78–100)
PLATELET # BLD AUTO: 207 10E9/L (ref 150–450)
POTASSIUM SERPL-SCNC: 3.6 MMOL/L (ref 3.4–5.3)
RBC # BLD AUTO: 3.52 10E12/L (ref 4.4–5.9)
SODIUM SERPL-SCNC: 138 MMOL/L (ref 133–144)
WBC # BLD AUTO: 8.4 10E9/L (ref 4–11)

## 2017-06-30 ENCOUNTER — NURSING HOME VISIT (OUTPATIENT)
Dept: GERIATRICS | Facility: CLINIC | Age: 77
End: 2017-06-30
Payer: COMMERCIAL

## 2017-06-30 VITALS
DIASTOLIC BLOOD PRESSURE: 58 MMHG | OXYGEN SATURATION: 96 % | TEMPERATURE: 98.2 F | SYSTOLIC BLOOD PRESSURE: 137 MMHG | WEIGHT: 184.8 LBS | HEART RATE: 53 BPM | RESPIRATION RATE: 20 BRPM

## 2017-06-30 DIAGNOSIS — B07.8 COMMON WART: ICD-10-CM

## 2017-06-30 DIAGNOSIS — N18.30 TYPE 2 DIABETES MELLITUS WITH STAGE 3 CHRONIC KIDNEY DISEASE, WITH LONG-TERM CURRENT USE OF INSULIN (H): ICD-10-CM

## 2017-06-30 DIAGNOSIS — Z79.4 TYPE 2 DIABETES MELLITUS WITH STAGE 3 CHRONIC KIDNEY DISEASE, WITH LONG-TERM CURRENT USE OF INSULIN (H): ICD-10-CM

## 2017-06-30 DIAGNOSIS — L03.114 CELLULITIS OF LEFT UPPER EXTREMITY: Primary | ICD-10-CM

## 2017-06-30 DIAGNOSIS — E11.22 TYPE 2 DIABETES MELLITUS WITH STAGE 3 CHRONIC KIDNEY DISEASE, WITH LONG-TERM CURRENT USE OF INSULIN (H): ICD-10-CM

## 2017-06-30 PROBLEM — N18.9 CHRONIC KIDNEY DISEASE: Status: ACTIVE | Noted: 2017-05-23

## 2017-06-30 PROCEDURE — 99308 SBSQ NF CARE LOW MDM 20: CPT | Performed by: NURSE PRACTITIONER

## 2017-06-30 RX ORDER — AMOXICILLIN AND CLAVULANATE POTASSIUM 500; 125 MG/1; MG/1
1 TABLET, FILM COATED ORAL EVERY 8 HOURS
COMMUNITY
End: 2017-07-18

## 2017-06-30 RX ORDER — ACETAMINOPHEN 325 MG/1
650 TABLET ORAL PRN
COMMUNITY

## 2017-06-30 NOTE — PROGRESS NOTES
[facility]    Chief Complaint   Patient presents with     RECHECK       HPI:    Jasson Argueta is a 77 year old  (1940), who is being seen today for an episodic care visit at [facility] [place].  HPI information obtained from: facility chart records, facility staff and patient report.    Today's concern is:  Cellulitis of left upper extremity  Staff reporting swelling around wart and blister on left hand getting worse.  OT has tried lymphedema therapy--helped for ~ one day but no longer helping. Patient c/o of pain vary.    Type 2 diabetes mellitus with stage 3 chronic kidney disease, with long-term current use of insulin (H)  Blood sugars stable on Lantus    Common wart  Has been frozen twice, with blister development      ALLERGIES: Review of patient's allergies indicates no known allergies.  Past Medical, Surgical, Family and Social History reviewed and updated in [facility].    Current Outpatient Prescriptions   Medication Sig Dispense Refill     acetaminophen (TYLENOL) 325 MG tablet Take 650 mg by mouth as needed for mild pain Do not exceed 3000mg in 24hrs       amoxicillin-clavulanate (AUGMENTIN) 500-125 MG per tablet Take 1 tablet by mouth every 8 hours       insulin glargine (LANTUS) 100 UNIT/ML injection Inject 10 Units Subcutaneous every morning       ASPIRIN PO Take 81 mg by mouth daily       tamsulosin (FLOMAX) 0.4 MG capsule Take 0.4 mg by mouth daily       AmLODIPine Besylate (NORVASC PO) Take 10 mg by mouth daily       PRAVASTATIN SODIUM PO Take 40 mg by mouth At Bedtime       METOPROLOL SUCCINATE ER PO Take 50 mg by mouth 2 times daily       Medications reviewed:  Medications reconciled to facility chart and changes were made to reflect current medications as identified as above med list.     REVIEW OF SYSTEMS:  4 point ROS including Respiratory, CV, GI and , other than that noted in the HPI,  is negative    Physical Exam:  /58  Pulse 53  Temp 98.2  F (36.8  C)   Resp 20  Wt 184 lb 12.8 oz (83.8 kg)  SpO2 96%  GENERAL APPEARANCE:  Alert, in no distress  RESP:  respiratory effort and palpation of chest normal, auscultation of lungs CTA , no respiratory distress  CV:  Palpation and auscultation of heart done , rate and rhythm regular, no peripheral edema  ABDOMEN:  normal bowel sounds, soft, nontender, no hepatosplenomegaly or other masses  SKIN:  Left hand warm to touch, swollen, red proximal wrist to fingers.  Wart with dissipating blister surrounding it  PSYCH:   memory impaired , affect and mood normal      Recent Labs:    CBC RESULTS:   Recent Labs   Lab Test  06/29/17   0610  06/08/17   0600   WBC  8.4  6.7   RBC  3.52*  3.71*   HGB  9.8*  10.3*   HCT  29.5*  32.0*   MCV  84  86   MCH  27.8  27.8   MCHC  33.2  32.2   RDW  15.0  17.1*   PLT  207  195       Last Basic Metabolic Panel:  Recent Labs   Lab Test  06/29/17   0610  06/08/17   0600   NA  138  144   POTASSIUM  3.6  3.8   CHLORIDE  108  112*   DEMETRIO  8.6  8.4*   CO2  22  23   BUN  18  20   CR  1.07  1.18   GLC  119*  155*         Lab Results   Component Value Date    A1C 5.9 06/08/2017             Assessment/Plan:  Cellulitis of left upper extremity  Will treat with ABX.  Will recheck CBC as hgb declining    Type 2 diabetes mellitus with stage 3 chronic kidney disease, with long-term current use of insulin (H)  stable    Common wart  Defer treatment until cellulitis resolved      Orders:  1. Amoxicillin/Clavulamate 500mg/125mg PO Q8hrs dx Left hand Ailments  2. Recheck CBC week of 7/10/17 dx Anemia       Electronically signed by  MELVIN Walters CNP

## 2017-07-06 ENCOUNTER — HOSPITAL LABORATORY (OUTPATIENT)
Facility: OTHER | Age: 77
End: 2017-07-06

## 2017-07-06 VITALS
RESPIRATION RATE: 18 BRPM | TEMPERATURE: 97.9 F | OXYGEN SATURATION: 98 % | HEIGHT: 65 IN | SYSTOLIC BLOOD PRESSURE: 141 MMHG | DIASTOLIC BLOOD PRESSURE: 69 MMHG | HEART RATE: 52 BPM | WEIGHT: 185 LBS | BODY MASS INDEX: 30.82 KG/M2

## 2017-07-06 LAB
ERYTHROCYTE [DISTWIDTH] IN BLOOD BY AUTOMATED COUNT: 14.9 % (ref 10–15)
HCT VFR BLD AUTO: 32.9 % (ref 40–53)
HGB BLD-MCNC: 10.8 G/DL (ref 13.3–17.7)
MCH RBC QN AUTO: 27.6 PG (ref 26.5–33)
MCHC RBC AUTO-ENTMCNC: 32.8 G/DL (ref 31.5–36.5)
MCV RBC AUTO: 84 FL (ref 78–100)
PLATELET # BLD AUTO: 251 10E9/L (ref 150–450)
RBC # BLD AUTO: 3.91 10E12/L (ref 4.4–5.9)
WBC # BLD AUTO: 8.9 10E9/L (ref 4–11)

## 2017-07-06 NOTE — PROGRESS NOTES
[facility]    Chief Complaint   Patient presents with     RECHECK       HPI:    Jasson Argueta is a 77 year old  (1940), who is being seen today for an episodic care visit at [facility] [place].  HPI information obtained from: facility chart records, facility staff and patient report.Today's concern is:  Cellulitis of left upper extremity  Resolved, antibiotics complete    Common wart  Patient requests resume freezing    Type 2 diabetes mellitus with stage 3 chronic kidney disease, with long-term current use of insulin (H)  Blood sugars stable:  07/07/2017 05:26 106 mg/dL  07/06/2017 16:50 180 mg/dL  07/06/2017 05:30 118 mg/dL  07/05/2017 17:17 179 mg/dL  07/05/2017 05:12 145 mg/dL  07/04/2017 16:01 146 mg/dL  07/04/2017 05:26 122 mg/dL  07/03/2017 16:20 142 mg/dL  07/03/2017 05:34 138 mg/dL  07/02/2017 16:56 205 mg/dL  07/02/2017 05:29 131 mg/dL  07/01/2017 17:02 213 mg/dL  07/01/2017 05:16 138 mg/d    Essential hypertension  BP stable  07/06/2017 11:37 141/69 mmHg (Sitting l/arm)  07/05/2017 15:12 148/58 mmHg (Lying r/arm)  07/04/2017 08:26 128/65 mmHg (Sitting l/arm)  07/03/2017 10:03 135/67 mmHg (Lying r/arm)  07/02/2017 12:12 130/65 mmHg (Sitting l/arm)  07/01/2017 13:01 128/60 mmHg (Sitting l/arm      ALLERGIES: Review of patient's allergies indicates no known allergies.  Past Medical, Surgical, Family and Social History reviewed and updated in [facility].    Current Outpatient Prescriptions   Medication Sig Dispense Refill     acetaminophen (TYLENOL) 325 MG tablet Take 650 mg by mouth as needed for mild pain Do not exceed 3000mg in 24hrs       amoxicillin-clavulanate (AUGMENTIN) 500-125 MG per tablet Take 1 tablet by mouth every 8 hours       insulin glargine (LANTUS) 100 UNIT/ML injection Inject 10 Units Subcutaneous every morning       ASPIRIN PO Take 81 mg by mouth daily       tamsulosin (FLOMAX) 0.4 MG capsule Take 0.4 mg by mouth daily       AmLODIPine Besylate (NORVASC PO) Take  "10 mg by mouth daily       PRAVASTATIN SODIUM PO Take 40 mg by mouth At Bedtime       METOPROLOL SUCCINATE ER PO Take 50 mg by mouth 2 times daily       Medications reviewed:  Medications reconciled to facility chart and changes were made to reflect current medications as identified as above med list. Below are the changes that were made:   Medications stopped since last EPIC medication reconciliation:   There are no discontinued medications.    Medications started since last [facility] medication reconciliation:  No orders of the defined types were placed in this encounter.      REVIEW OF SYSTEMS:  4 point ROS including Respiratory, CV, GI and , other than that noted in the HPI,  is negative    Physical Exam:  /69  Pulse 52  Temp 97.9  F (36.6  C)  Resp 18  Ht 5' 5\" (1.651 m)  Wt 185 lb (83.9 kg)  SpO2 98%  BMI 30.79 kg/m2  GENERAL APPEARANCE:  Alert, in no distress, cooperative  M/S:   Gait and station abnormal using walker, shuffle gait  SKIN:  left hand cellulitis resolved.  No further swelling, redness, pain.  Wart remains,   PSYCH:  insight and judgement impaired, memory impaired     Recent Labs:  CBC RESULTS:   Recent Labs   Lab Test  07/06/17   0640  06/29/17   0610   WBC  8.9  8.4   RBC  3.91*  3.52*   HGB  10.8*  9.8*   HCT  32.9*  29.5*   MCV  84  84   MCH  27.6  27.8   MCHC  32.8  33.2   RDW  14.9  15.0   PLT  251  207       Last Basic Metabolic Panel:  Recent Labs   Lab Test  06/29/17   0610  06/08/17   0600   NA  138  144   POTASSIUM  3.6  3.8   CHLORIDE  108  112*   DEMETRIO  8.6  8.4*   CO2  22  23   BUN  18  20   CR  1.07  1.18   GLC  119*  155*         Lab Results   Component Value Date    A1C 5.9 06/08/2017           Assessment/Plan:  Cellulitis of left upper extremity  resolved    Common wart  Froze x2 per protocol without complication.  Will assess in 1-2 weeks for further tx    Type 2 diabetes mellitus with stage 3 chronic kidney disease, with long-term current use of insulin " (H)  Stable, continue current regime    Essential hypertension  Stable, continue current regime                Electronically signed by  MELVIN Walters CNP

## 2017-07-07 ENCOUNTER — NURSING HOME VISIT (OUTPATIENT)
Dept: GERIATRICS | Facility: CLINIC | Age: 77
End: 2017-07-07
Payer: COMMERCIAL

## 2017-07-07 DIAGNOSIS — E11.22 TYPE 2 DIABETES MELLITUS WITH STAGE 3 CHRONIC KIDNEY DISEASE, WITH LONG-TERM CURRENT USE OF INSULIN (H): ICD-10-CM

## 2017-07-07 DIAGNOSIS — Z79.4 TYPE 2 DIABETES MELLITUS WITH STAGE 3 CHRONIC KIDNEY DISEASE, WITH LONG-TERM CURRENT USE OF INSULIN (H): ICD-10-CM

## 2017-07-07 DIAGNOSIS — N18.30 TYPE 2 DIABETES MELLITUS WITH STAGE 3 CHRONIC KIDNEY DISEASE, WITH LONG-TERM CURRENT USE OF INSULIN (H): ICD-10-CM

## 2017-07-07 DIAGNOSIS — I10 ESSENTIAL HYPERTENSION: ICD-10-CM

## 2017-07-07 DIAGNOSIS — L03.114 CELLULITIS OF LEFT UPPER EXTREMITY: Primary | ICD-10-CM

## 2017-07-07 DIAGNOSIS — B07.8 COMMON WART: ICD-10-CM

## 2017-07-07 PROCEDURE — 17110 DESTRUCTION B9 LES UP TO 14: CPT | Performed by: NURSE PRACTITIONER

## 2017-07-07 PROCEDURE — 99308 SBSQ NF CARE LOW MDM 20: CPT | Mod: 25 | Performed by: NURSE PRACTITIONER

## 2017-07-07 NOTE — MR AVS SNAPSHOT
"              After Visit Summary   2017    Jasson Argueta    MRN: [id number]           Patient Information     Date Of Birth          1940        Visit Information        Provider Department      2017 3:00 PM Melissa Castano APRN CNP [facility]        Today's Diagnoses     Cellulitis of left upper extremity    -  1    Common wart        Type 2 diabetes mellitus with stage 3 chronic kidney disease, with long-term current use of insulin (H)        Essential hypertension           Follow-ups after your visit        Who to contact     If you have questions or need follow up information about today's clinic visit or your schedule please contact [facility] directly at [phone].  Normal or non-critical lab and imaging results will be communicated to you by [employer], letter or phone within 4 business days after the clinic has received the results. If you do not hear from us within 7 days, please contact the clinic through [employer] or phone. If you have a critical or abnormal lab result, we will notify you by phone as soon as possible.  Submit refill requests through [employer] or call your pharmacy and they will forward the refill request to us. Please allow 3 business days for your refill to be completed.          Additional Information About Your Visit        MyChart Information     [employer] lets you send messages to your doctor, view your test results, renew your prescriptions, schedule appointments and more. To sign up, go to [email or web address] . Click on \"Log in\" on the left side of the screen, which will take you to the Welcome page. Then click on \"Sign up Now\" on the right side of the page.     You will be asked to enter the access code listed below, as well as some personal information. Please follow the directions to create your username and password.     Your access code is: [email or web address]  Expires: 10/5/2017  3:36 PM     Your access code will  in 90 " "days. If you need help or a new code, please call your [facility] clinic or [phone].        Care EveryWhere ID     This is your Care EveryWhere ID. This could be used by other organizations to access your [facility] medical records  [id number]        Your Vitals Were     Pulse Temperature Respirations Height Pulse Oximetry BMI (Body Mass Index)    52 97.9  F (36.6  C) 18 5' 5\" (1.651 m) 98% 30.79 kg/m2       Blood Pressure from Last 3 Encounters:   07/06/17 141/69   06/30/17 137/58   06/22/17 130/70    Weight from Last 3 Encounters:   07/06/17 185 lb (83.9 kg)   06/30/17 184 lb 12.8 oz (83.8 kg)   06/19/17 185 lb (83.9 kg)              We Performed the Following     DESTRUCT BENIGN LESION, UP TO 14        Primary Care Provider Office Phone # Fax #    Nickolas Barahona [phone]       [facility] [address] [place] [address]        Equal Access to Services     [facility]: Hadii aad ku hadasho Soomaali, waaxda luqadaha, qaybta kaalmada adejimenez, figueroa escoto . So [facility] [phone].    ATENCIÓN: Si habla español, tiene a ocampo disposición servicios gratuitos de asistencia lingüística. [facility] [phone].    We comply with applicable federal civil rights laws and [place] laws. We do not discriminate on the basis of race, color, national origin, age, disability sex, sexual orientation or gender identity.            Thank you!     Thank you for choosing [facility]  for your care. Our goal is always to provide you with excellent care. Hearing back from our patients is one way we can continue to improve our services. Please take a few minutes to complete the written survey that you may receive in the mail after your visit with us. Thank you!             Your Updated Medication List - Protect others around you: Learn how to safely use, store and throw away your medicines at www.disposemymeds.org.          This list is accurate as of: 7/7/17  " 3:36 PM.  Always use your most recent med list.                   Brand Name Dispense Instructions for use Diagnosis    acetaminophen 325 MG tablet    TYLENOL     Take 650 mg by mouth as needed for mild pain Do not exceed 3000mg in 24hrs        amoxicillin-clavulanate 500-125 MG per tablet    AUGMENTIN     Take 1 tablet by mouth every 8 hours        ASPIRIN PO      Take 81 mg by mouth daily        FLOMAX 0.4 MG capsule   Generic drug:  tamsulosin      Take 0.4 mg by mouth daily        insulin glargine 100 UNIT/ML injection    LANTUS     Inject 10 Units Subcutaneous every morning        METOPROLOL SUCCINATE ER PO      Take 50 mg by mouth 2 times daily        NORVASC PO      Take 10 mg by mouth daily        PRAVASTATIN SODIUM PO      Take 40 mg by mouth At Bedtime

## 2017-07-18 ENCOUNTER — NURSING HOME VISIT (OUTPATIENT)
Dept: GERIATRICS | Facility: CLINIC | Age: 77
End: 2017-07-18
Payer: COMMERCIAL

## 2017-07-18 VITALS
HEART RATE: 55 BPM | DIASTOLIC BLOOD PRESSURE: 55 MMHG | SYSTOLIC BLOOD PRESSURE: 132 MMHG | WEIGHT: 187.2 LBS | TEMPERATURE: 97.2 F | OXYGEN SATURATION: 93 % | BODY MASS INDEX: 31.15 KG/M2 | RESPIRATION RATE: 18 BRPM

## 2017-07-18 DIAGNOSIS — B07.8 COMMON WART: Primary | ICD-10-CM

## 2017-07-18 PROCEDURE — 99308 SBSQ NF CARE LOW MDM 20: CPT | Performed by: NURSE PRACTITIONER

## 2017-07-18 PROCEDURE — 99207 ZZC CDG-CORRECTLY CODED, REVIEWED AND AGREE: CPT | Performed by: NURSE PRACTITIONER

## 2017-07-18 NOTE — PROGRESS NOTES
[facility]    Chief Complaint   Patient presents with     Nursing Home Acute       HPI:    Jasson Argueta is a 77 year old  (1940), who is being seen today for an episodic care visit at [facility] [place].  HPI information obtained from: facility staff and patient report.Today's concern is:  Common wart  Staff report that wart fell off on its own yesterday.  Concerned re:  Redness  Patient denies pain or concerns  Patient daughter is arranging transfer to LTC facility closer to her home in [facility]      ALLERGIES: Review of patient's allergies indicates no known allergies.  Past Medical, Surgical, Family and Social History reviewed and updated in [facility].    Current Outpatient Prescriptions   Medication Sig Dispense Refill     acetaminophen (TYLENOL) 325 MG tablet Take 650 mg by mouth as needed for mild pain Do not exceed 3000mg in 24hrs       insulin glargine (LANTUS) 100 UNIT/ML injection Inject 10 Units Subcutaneous every morning       ASPIRIN PO Take 81 mg by mouth daily       tamsulosin (FLOMAX) 0.4 MG capsule Take 0.4 mg by mouth daily       AmLODIPine Besylate (NORVASC PO) Take 10 mg by mouth daily       PRAVASTATIN SODIUM PO Take 40 mg by mouth At Bedtime       METOPROLOL SUCCINATE ER PO Take 50 mg by mouth 2 times daily       Medications reviewed:  Medications reconciled to facility chart and changes were made to reflect current medications as identified as above med list. Below are the changes that were made:   Medications stopped since last EPIC medication reconciliation:   Medications Discontinued During This Encounter   Medication Reason     amoxicillin-clavulanate (AUGMENTIN) 500-125 MG per tablet        Medications started since last [facility] medication reconciliation:  No orders of the defined types were placed in this encounter.    REVIEW OF SYSTEMS:  4 point ROS including Respiratory, CV, GI and , other than that noted in the HPI,  is negative    Physical Exam:  BP  132/55  Pulse 55  Temp 97.2  F (36.2  C)  Resp 18  Wt 187 lb 3.2 oz (84.9 kg)  SpO2 93%  BMI 31.15 kg/m2  GENERAL APPEARANCE:  Alert, in no distress, cooperative  SKIN:  large wart missing on left hand.  small residual remains.  area around is slightly reddened, no edema, drainage  PSYCH:  memory impaired , affect and mood normal    Recent Labs:    Hospital Laboratory on 07/06/2017   Component Date Value Ref Range Status     WBC 07/06/2017 8.9  4.0 - 11.0 10e9/L Final     RBC Count 07/06/2017 3.91* 4.4 - 5.9 10e12/L Final     Hemoglobin 07/06/2017 10.8* 13.3 - 17.7 g/dL Final     Hematocrit 07/06/2017 32.9* 40.0 - 53.0 % Final     MCV 07/06/2017 84  78 - 100 fl Final     MCH 07/06/2017 27.6  26.5 - 33.0 pg Final     MCHC 07/06/2017 32.8  31.5 - 36.5 g/dL Final     RDW 07/06/2017 14.9  10.0 - 15.0 % Final     Platelet Count 07/06/2017 251  150 - 450 10e9/L Final         Assessment/Plan:  Common wart  resolved      Orders:  No new orders.  D/C cryotherapy treatments        Electronically signed by  MELVIN Walters CNP

## 2017-07-25 ENCOUNTER — DISCHARGE SUMMARY NURSING HOME (OUTPATIENT)
Dept: GERIATRICS | Facility: CLINIC | Age: 77
End: 2017-07-25
Payer: COMMERCIAL

## 2017-07-25 VITALS
SYSTOLIC BLOOD PRESSURE: 127 MMHG | HEART RATE: 44 BPM | DIASTOLIC BLOOD PRESSURE: 56 MMHG | WEIGHT: 184.8 LBS | RESPIRATION RATE: 20 BRPM | OXYGEN SATURATION: 96 % | TEMPERATURE: 97.4 F | BODY MASS INDEX: 30.75 KG/M2

## 2017-07-25 DIAGNOSIS — R33.9 URINARY RETENTION: ICD-10-CM

## 2017-07-25 DIAGNOSIS — F01.50 VASCULAR DEMENTIA WITHOUT BEHAVIORAL DISTURBANCE (H): Primary | ICD-10-CM

## 2017-07-25 DIAGNOSIS — N18.30 TYPE 2 DIABETES MELLITUS WITH STAGE 3 CHRONIC KIDNEY DISEASE, WITH LONG-TERM CURRENT USE OF INSULIN (H): ICD-10-CM

## 2017-07-25 DIAGNOSIS — E11.22 TYPE 2 DIABETES MELLITUS WITH STAGE 3 CHRONIC KIDNEY DISEASE, WITH LONG-TERM CURRENT USE OF INSULIN (H): ICD-10-CM

## 2017-07-25 DIAGNOSIS — Z79.4 TYPE 2 DIABETES MELLITUS WITH STAGE 3 CHRONIC KIDNEY DISEASE, WITH LONG-TERM CURRENT USE OF INSULIN (H): ICD-10-CM

## 2017-07-25 DIAGNOSIS — R53.81 PHYSICAL DECONDITIONING: ICD-10-CM

## 2017-07-25 PROCEDURE — 99207 ZZC CDG-CORRECTLY CODED, REVIEWED AND AGREE: CPT | Performed by: NURSE PRACTITIONER

## 2017-07-25 PROCEDURE — 99315 NF DSCHRG MGMT 30 MIN/LESS: CPT | Performed by: NURSE PRACTITIONER

## 2017-07-25 NOTE — PROGRESS NOTES
[facility] DISCHARGE SUMMARY    PATIENT'S NAME: Jasson Argueta  YOB: 1940  MEDICAL RECORD NUMBER:  [id number]    PRIMARY CARE PROVIDER AND CLINIC RESPONSIBLE AFTER TRANSFER: will need to find new PCP in [place]    CODE STATUS/ADVANCE DIRECTIVES DISCUSSION:   CPR/Full code      No Known Allergies    TRANSFERRING PROVIDERS: MELVIN Walters CNP,   DATE OF SNF ADMISSION:  June / 01 / 2017  DATE OF SNF (anticipated) DISCHARGE: July / 28 / 2017  DISCHARGE DISPOSITION: Non-[facility] Provider   Nursing Facility: [facility] stay 5/22/17  to 6/1/17.     Condition on Discharge:  Stable.  Function:  Independent with supervision, guidance   Cognitive Scores: ACL 4.4 indicating need for assistance    Equipment: walker    DISCHARGE DIAGNOSIS:   1. Vascular dementia without behavioral disturbance    2. Physical deconditioning    3. Type 2 diabetes mellitus with stage 3 chronic kidney disease, with long-term current use of insulin (H)    4. Urinary retention        HPI Nursing Facility Course:  HPI information obtained from: facility chart records and facility staff.Patient was admitted to hospital after being found down-he had not gotten the paper in several days and neighbors called 911. 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 He was found to be confused, with UTI, dehydration, arf, DM2, HTN. He was rehydrated, developed some urinary retention and a catheter was placed. Started on flomax. He was then moved to TCU for ongoing therapy, strengthening. He has met therapy goals.  Due to continued cognitive impairment will be moving to a supervised living setting in [facility] near his daughter  Vascular dementia without behavioral disturbance  Remains cognitively impaired, not a good historian    Physical deconditioning  Has met rehab goals    Type 2 diabetes mellitus with stage 3 chronic kidney disease, with long-term  current use of insulin (H)  Has been well managed on current regime  Blood sugars:  07/25/2017 05:16 140 mg/dL  07/24/2017 17:34 192 mg/dL  07/24/2017 05:01 155 mg/dL  07/23/2017 18:06 197 mg/dL  07/23/2017 05:02 199 mg/dL  07/22/2017 17:35 136 mg/dL  07/22/2017 05:10 129 mg/dL      Urinary retention  On flomax.  Catheterizing for residual d/c'd    Also had a large wart removed with several cryotherapy treatments, was treated for cellulitis of left hand      PAST MEDICAL HISTORY:  has no past medical history on file.    DISCHARGE MEDICATIONS:  Current Outpatient Prescriptions   Medication Sig Dispense Refill     acetaminophen (TYLENOL) 325 MG tablet Take 650 mg by mouth as needed for mild pain Do not exceed 3000mg in 24hrs       insulin glargine (LANTUS) 100 UNIT/ML injection Inject 10 Units Subcutaneous every morning       ASPIRIN PO Take 81 mg by mouth daily       tamsulosin (FLOMAX) 0.4 MG capsule Take 0.4 mg by mouth daily       AmLODIPine Besylate (NORVASC PO) Take 10 mg by mouth daily       PRAVASTATIN SODIUM PO Take 40 mg by mouth At Bedtime       METOPROLOL SUCCINATE ER PO Take 50 mg by mouth 2 times daily         MEDICATION CHANGES/RATIONALE:   Insulin added to manage blood sugars  Controlled medications sent with patient: not applicable/none     ROS:    10 point ROS of systems including Constitutional, Eyes, Respiratory, Cardiovascular, Gastroenterology, Genitourinary, Integumentary, Muscularskeletal, Psychiatric were all negative except for pertinent positives noted in my HPI.    Physical Exam:   Vitals: /56  Pulse (!) 44  Temp 97.4  F (36.3  C)  Resp 20  Wt 184 lb 12.8 oz (83.8 kg)  SpO2 96%  BMI 30.75 kg/m2  BMI= Body mass index is 30.75 kg/(m^2).    GENERAL APPEARANCE:  Alert, in no distress, cooperative  ENT:  Mouth and posterior oropharynx normal, moist mucous membranes  EYES:  EOM, conjunctivae, lids, pupils and irises normal  NECK:  No adenopathy,masses or thyromegaly  RESP:  lungs  clear to auscultation , no respiratory distress  CV:  regular rate and rhythm, no murmur, rub, or gallop  ABDOMEN:  normal bowel sounds, soft, nontender, no hepatosplenomegaly or other masses  M/S:   Gait and station abnormal uses walker, stand by assist  SKIN:  residual of wart on left hand thumb and first finger juncture.  no further s/s cellulites  PSYCH:  insight and judgement impaired, memory impaired     DISCHARGE PLAN:  assisted/supervised living in [facility]  Patient instructed to follow-up with:  PCP in 7 days      [facility] scheduled appointments: none  Future Appointments will need to establish care in [facility] referral needed and placed by this provider: No    Pending labs: none    [facility] labs   Hospital Laboratory on 2017   Component Date Value Ref Range Status     WBC 2017 8.9  4.0 - 11.0 10e9/L Final     RBC Count 2017 3.91* 4.4 - 5.9 10e12/L Final     Hemoglobin 2017 10.8* 13.3 - 17.7 g/dL Final     Hematocrit 2017 32.9* 40.0 - 53.0 % Final     MCV 2017 84  78 - 100 fl Final     MCH 2017 27.6  26.5 - 33.0 pg Final     MCHC 2017 32.8  31.5 - 36.5 g/dL Final     RDW 2017 14.9  10.0 - 15.0 % Final     Platelet Count 2017 251  150 - 450 10e9/L Final         Discharge Treatments:none    Face to Face and Medical Necessity Statement for DME Provider visit    Demographic Information on Jasson Argueta:  Gender: male  : 1940  [address] [place] [address] [phone] (home)     Medical Record: [id number]  Social Security Number: [id number]  Primary Care Provider: Nickolas Barahona  Insurance: Payor: BCBS / Plan: [employer] PLATINUM BLUE / Product Type: PPO /     HPI:   Jasson Argueta is a 77 year old  (1940), who is being seen today for a face to face provider visit at [facility]; medical necessity statement for DME included. This patient requires the following:  DME ordered:  Walker:  2 wheeled standard    Medical Necessity  Statement   Pt needing above DME with expected length of need of 99 years    due to medical necessity associated with following diagnosis:     Vascular dementia without behavioral disturbance  Physical deconditioning  Type 2 diabetes mellitus with stage 3 chronic kidney disease, with long-term current use of insulin (H)  Urinary retention      PMH   has no past medical history on file.  CURRENT MEDICATIONS  Current Outpatient Prescriptions   Medication Sig Dispense Refill     acetaminophen (TYLENOL) 325 MG tablet Take 650 mg by mouth as needed for mild pain Do not exceed 3000mg in 24hrs       insulin glargine (LANTUS) 100 UNIT/ML injection Inject 10 Units Subcutaneous every morning       ASPIRIN PO Take 81 mg by mouth daily       tamsulosin (FLOMAX) 0.4 MG capsule Take 0.4 mg by mouth daily       AmLODIPine Besylate (NORVASC PO) Take 10 mg by mouth daily       PRAVASTATIN SODIUM PO Take 40 mg by mouth At Bedtime       METOPROLOL SUCCINATE ER PO Take 50 mg by mouth 2 times daily       ROS:4 point ROS including Respiratory, CV, GI and , other than that noted in the HPI,  is negative     EXAM  Vitals: /56  Pulse (!) 44  Temp 97.4  F (36.3  C)  Resp 20  Wt 184 lb 12.8 oz (83.8 kg)  SpO2 96%  BMI 30.75 kg/m2;BMI= Body mass index is 30.75 kg/(m^2).  See exam above     ASSESSMENT/PLAN:  1. Vascular dementia without behavioral disturbance    2. Physical deconditioning    3. Type 2 diabetes mellitus with stage 3 chronic kidney disease, with long-term current use of insulin (H)    4. Urinary retention        Orders:  1. Facility staff/TC to contact DME company to get their order form for provider to fill out    ELECTRONICALLY SIGNED BY EKTA CERTIFIED PROVIDER:  MELVIN Walters CNP   NPI: [id number]  [facility]  [address] [place] [address], [address]  [place] [address]        TOTAL DISCHARGE TIME:   Less than or equal to 30 minutes  Electronically signed by:  MELVIN Walters CNP

## 2017-10-04 ENCOUNTER — DOCUMENTATION ONLY (OUTPATIENT)
Dept: OTHER | Facility: CLINIC | Age: 77
End: 2017-10-04